# Patient Record
(demographics unavailable — no encounter records)

---

## 2024-11-12 NOTE — LETTER BODY
[Attached please find my note.] : Attached please find my note. [FreeTextEntry2] : Elio Olmedo MD [address] [facility] [address] [place] [address] Tel [phone] Fax [phone]   Dear Dr Olmedo   Ms René Davila was seen in my office for a colposcopic evaluation .  Please see my consult note for her plan of care.  Thank you for allowing me to participate in the care of this patient.    Please do not hesitate to call if you have any questions.    Most Sincerely,      Dre Martinez MD [facility] Director, [facility] Professor of Obstetrics and Gynecology, [facility] [employer] at [facility] Division of Gynecologic Oncology Department Obstetrics and Gynecology Tel [phone] [email or web address]

## 2024-11-12 NOTE — ASSESSMENT
[FreeTextEntry1] : Colposcopy evaluation with biopsy was performed given patient's history of CARLITOS-3 and RAMIREZ 3.  ASCUS Pap smear with positive HPV high risk type was noted earlier this year.  Colposcopic evaluation and findings were described as above.  We will wait for the results before discussing it with patient on the phone and any further management if necessary.  Patient expressed understanding of the plan.

## 2024-11-12 NOTE — HISTORY OF PRESENT ILLNESS
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 Patient is here for colposcopic evaluation.  Consent form was signed.   6/6/24: Presented for follow up and pap smear. Reports feelling well. No  or GI symptoms.  She had no vaginal bleeding.  She was complaining of some pain on the right side of her right breast.  She described this to be a chronic thing.  It was intermittent.  A mammogram recently was negative for any abnormality.  Healthcare Maintenance:  - Mammogram and bilateral U/S: 3/2024, normal reportedly - Colonoscopy: 2021, normal result reportedly  Specimen(s) Submitted 1 Cervical cone biopsy marked @ 12 o'oclock 2 Right apical partial vaginectomy 3 Left apical partial vaginectomy  Final Diagnosis 1. Cervical cone; biopsy: - Cervical tissue with predominantly denuded surface epithelium. - There is no evidence of dysplasia or malignancy.  2. 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 - Resection margins are free of lesional tissue.  3. Left apical vaginal tissue; partial vaginectomy: - Inflamed squamous mucosa with reactive/reparative changes, status post prior biopsy. See comment.

## 2024-11-12 NOTE — PROCEDURE
[Colposcopy] : colposcopy [ASCUS] : ASCUS [HPV high risk] : PCR positive for high risk HPV [Patient] : the patient [Silver Nitrate] : silver nitrate [FreeTextEntry1] : Colposcopy was performed without any complication. Acetowhite epithelium was noted at the cervical vaginal junction both at the 9-11 o'clock region and 1-3 o'clock region. We performed biopsy on both sides at the 10 and 2:00 area. Endocervical curettage was also performed. The patient tolerated the procedure well.  Patient reported minimal if any pain/discomfort. Specimen sent to pathology for evaluation. Silver nitrate stick was used to achieve hemostasis.

## 2024-11-12 NOTE — PHYSICAL EXAM
[Chaperone Present] : A chaperone was present in the examining room during all aspects of the physical examination [11600] : A chaperone was present during the pelvic exam. [FreeTextEntry2] : Elva [Absent] : CVAT: absent [Normal] : Recto-Vaginal Exam: Normal [de-identified] : Anteverted normal size mobile nontender. [Fully active, able to carry on all pre-disease performance without restriction] : Status 0 - Fully active, able to carry on all pre-disease performance without restriction